# Patient Record
Sex: FEMALE | Race: WHITE | NOT HISPANIC OR LATINO | Employment: OTHER | URBAN - METROPOLITAN AREA
[De-identification: names, ages, dates, MRNs, and addresses within clinical notes are randomized per-mention and may not be internally consistent; named-entity substitution may affect disease eponyms.]

---

## 2024-04-05 ENCOUNTER — MEDICAL CORRESPONDENCE (OUTPATIENT)
Dept: HEALTH INFORMATION MANAGEMENT | Facility: CLINIC | Age: 59
End: 2024-04-05
Payer: COMMERCIAL

## 2024-04-09 ENCOUNTER — TRANSCRIBE ORDERS (OUTPATIENT)
Dept: OTHER | Age: 59
End: 2024-04-09

## 2024-04-09 DIAGNOSIS — K22.4 ESOPHAGEAL DYSMOTILITY AFTER BARIATRIC SURGERY: ICD-10-CM

## 2024-04-09 DIAGNOSIS — K95.89 ESOPHAGEAL DYSMOTILITY AFTER BARIATRIC SURGERY: ICD-10-CM

## 2024-04-09 DIAGNOSIS — R13.10 DYSPHAGIA, UNSPECIFIED TYPE: Primary | ICD-10-CM

## 2024-05-19 ENCOUNTER — HEALTH MAINTENANCE LETTER (OUTPATIENT)
Age: 59
End: 2024-05-19

## 2024-06-19 ENCOUNTER — TELEPHONE (OUTPATIENT)
Dept: GASTROENTEROLOGY | Facility: CLINIC | Age: 59
End: 2024-06-19
Payer: COMMERCIAL

## 2024-06-19 NOTE — TELEPHONE ENCOUNTER
REFERRAL INFORMATION:  Referring Provider:  Quiana Tovar DO  Referring Clinic:  [facility]  Ph: [phone], Fx: [phone]  Reason for Visit/Diagnosis:   R13.10 (ICD-10-CM) - Dysphagia, unspecified type   K95.89, K22.4 (ICD-10-CM) - Esophageal dysmotility after bariatric surgery, Esophageal dysmotility with ineffective swallows and associated dysphagia. Status post gastric bypass Lena-Anthony reconstrction.         FUTURE VISIT INFORMATION:  Appointment Date: 6/21/24  Appointment Time:      NOTES STATUS DETAILS   OFFICE NOTE from Referring Provider Care Everywhere 3/27/24- quiana SHAFER 3/6/24   OFFICE NOTE from Other Specialist Care Everywhere 10/25/23-[facility]    9/1/23 [facility] DISCHARGE SUMMARY/  ED VISITS Care Everywhere    OPERATIVE REPORT Care Everywhere LAPAROSCOPIC GASTRIC RESTRICTIVE PX,W/GASTRIC BYPASS/ LENA-EN-Y,<150CM 7/21/15-Dr. Lozada-[facility]   MEDICATION LIST Internal         ENDOSCOPY  Care Everywhere 1/11/24-[facility]    1/4/23 [facility]    9/14/2085-Djybknit-Nuugb   COLONOSCOPY Care Everywhere 1/3/57-Nypdpabg-Ltxdt   STOOL TESTING Care Everywhere 2/1/23-H. Pylori-[facility]   PERTINENT LABS Care Everywhere    IMAGING (CT, MRI, EGD, MRCP, Small Bowel Follow Through/SBT, MR/CT Enterography) Care Everywhere CT abdomen pelvis 3/20/24, 8/30/2324-Qyddgufk-Bnkne    XR Esophagram 3/20/24, 10/26/48-Ndhzmyoy-Aszgs     Records Requested     June 20, 2024 7:48 AM  [id number]   Facility  [facility]   Outcome Requested images

## 2024-06-19 NOTE — TELEPHONE ENCOUNTER
reached out to offer Pt a sooner appointment with Dr. Duffy on 6/21/2024. Pt accepted an appointment on 6/21/2024 at 9:40 AM for a virtual visit.

## 2024-06-21 ENCOUNTER — VIRTUAL VISIT (OUTPATIENT)
Dept: GASTROENTEROLOGY | Facility: CLINIC | Age: 59
End: 2024-06-21
Payer: COMMERCIAL

## 2024-06-21 ENCOUNTER — MYC MEDICAL ADVICE (OUTPATIENT)
Dept: GASTROENTEROLOGY | Facility: CLINIC | Age: 59
End: 2024-06-21

## 2024-06-21 ENCOUNTER — PRE VISIT (OUTPATIENT)
Dept: GASTROENTEROLOGY | Facility: CLINIC | Age: 59
End: 2024-06-21

## 2024-06-21 VITALS — BODY MASS INDEX: 32.58 KG/M2 | HEIGHT: 68 IN | WEIGHT: 215 LBS

## 2024-06-21 DIAGNOSIS — K22.4 ESOPHAGEAL DYSMOTILITY AFTER BARIATRIC SURGERY: Primary | ICD-10-CM

## 2024-06-21 DIAGNOSIS — R12 HEARTBURN: ICD-10-CM

## 2024-06-21 DIAGNOSIS — R09.A2 GLOBUS SENSATION: ICD-10-CM

## 2024-06-21 DIAGNOSIS — R11.10 REGURGITATION OF FOOD: ICD-10-CM

## 2024-06-21 DIAGNOSIS — K22.4 ESOPHAGEAL DYSMOTILITY AFTER BARIATRIC SURGERY: ICD-10-CM

## 2024-06-21 DIAGNOSIS — K95.89 ESOPHAGEAL DYSMOTILITY AFTER BARIATRIC SURGERY: Primary | ICD-10-CM

## 2024-06-21 DIAGNOSIS — R13.19 ESOPHAGEAL DYSPHAGIA: ICD-10-CM

## 2024-06-21 DIAGNOSIS — R13.19 ESOPHAGEAL DYSPHAGIA: Primary | ICD-10-CM

## 2024-06-21 DIAGNOSIS — K95.89 ESOPHAGEAL DYSMOTILITY AFTER BARIATRIC SURGERY: ICD-10-CM

## 2024-06-21 PROCEDURE — 99205 OFFICE O/P NEW HI 60 MIN: CPT | Mod: 95 | Performed by: INTERNAL MEDICINE

## 2024-06-21 PROCEDURE — G2211 COMPLEX E/M VISIT ADD ON: HCPCS | Mod: 95 | Performed by: INTERNAL MEDICINE

## 2024-06-21 ASSESSMENT — PAIN SCALES - GENERAL: PAINLEVEL: NO PAIN (0)

## 2024-06-21 NOTE — LETTER
6/21/2024      Julianna Reyna  [address]  [place] [address]      Dear Colleague,    Thank you for referring your patient, Julianna Reyna, to the [facility] [place]. Please see a copy of my visit note below.    Virtual Visit Details    Type of service:  Video Visit   Video Start Time: 9:23 AM  Video End Time: 948    Originating Location (pt. Location): Home    Distant Location (provider location):  On-site  Platform used for Video Visit: AmWellGastroenterology Visit for: Julianna Reyna 1965   MRN: [id number]     Reason for Visit:  chief complaint    Referred by: La  / Jero [address] [place] [id number]  Patient Care Team:  Connor Tovar DO as PCP - General (General Surgery)  Connor Tovar DO (General Surgery)  Andrew Duffy DO as Physician (Gastroenterology)    History of Present Illness:   Julianna Reyna is a 59 year old female with wendy en y gastric bypass, hiatal hernia, dysphagia, JIMENEZ on CPAP, trigeminal neuralgia who is presenting as a new patient in consultation at the request of Dr. Tovar with a chief complaint of dysphagia.    ---------------------------------------------------------------  6/21/24  Julianna Reyna states she has had issues with swallowing. She feels that food gets stuck in her throat. She feels that she has a constant glob of mucus in her throat. In order to get what she feels is in her throat out she will induce regurgitation. She will be able to eliminate mucus followed by food. There is no sign of digestion. She feels that what comes out is the food as she has swallowed it. This sensation has been for at least 3 years ago following corpectomy when they moved her esophagus. The regurgitation started 9 months ago. States that the manometry shows that things are not going down as they should. Says that she was told to eat more slowly and take smaller bites. She feels that the glob of mucus is the main problem. Regardless  of food or water she always has the sensation of globus. The patient states she is extremely fatigued and believes that her difficulty swallowing is the cause. Maybe ineffective esophageal motility based on order for referral. If she waits too long she can have effortless regurgitation. Currently on omeprazole 20mg twice daily. She has been on it for quite awhile for GERD. The patient faithfully takes the med and is uncertain if she would have symptoms if she didn't take a dose. She can have stomach burning and her biggest fear is having a recurrence of that if she stopped the medication. Patient states that after undergoing endoscopy with dilation for barium tablet getting hung up at [facility] she had benefit for maybe one week if that. Patient is tired of regurgitating which has reduced her ability to eat.     See updated BEDQ and Eckardt score below: These patient reported outcomes are pertinent to the HPI and have personally been reviewed.    ---------------------------------------------------------------     Wt Readings from Last 5 Encounters:   06/21/24 97.5 kg (215 lb)     Esophageal Questionnaire(s)    BEDQ Questionnaire      6/20/2024     9:30 AM   BEDQ Questionnaire: How Often Have You Had the Following?   Trouble eating solid food (meat, bread, vegetables) 3   Trouble eating soft foods (yogurt, jello, pudding) 0   Trouble swallowing liquids 1   Pain while swallowing 0   Coughing or choking while swallowing foods or liquids 0   Total Score: 4         6/20/2024     9:30 AM   BEDQ Questionnaire: Discomfort/Pain Ratings   Eating solid food (meat, bread, vegetables) 4   Eating soft foods (yogurt, jello, pudding) 1   Drinking liquid 1   Total Score: 6       Eckardt Questionnaire      6/20/2024     9:31 AM   Eckardt Questionnaire   Dysphagia 2   Regurgitation 2   Retrosternal Pain 1   Weight Loss (kg) 1   Total Score:  6       Promis 10 Questionnaire      6/20/2024     9:37 AM   PROMIS 10 FLOWSHEET DATA   In general,  would you say your health is: 2   In general, would you say your quality of life is: 2   In general, how would you rate your physical health? 2   In general, how would you rate your mental health, including your mood and your ability to think? 3   In general, how would you rate your satisfaction with your social activities and relationships? 4   In general, please rate how well you carry out your usual social activities and roles. (This includes activities at home, at work and in your community, and responsibilities as a parent, child, spouse, employee, friend, etc.) 4   To what extent are you able to carry out your everyday physical activities such as walking, climbing stairs, carrying groceries, or moving a chair? 3   In the past 7 days, how often have you been bothered by emotional problems such as feeling anxious, depressed, or irritable? 2   In the past 7 days, how would you rate your fatigue on average? 4   In the past 7 days, how would you rate your pain on average, where 0 means no pain, and 10 means worst imaginable pain? 5   Mental health question re-calculation - no clinical value 4   Physical health question re-calculation - no clinical value 2   Pain question re-calculation - no clinical value 3   Global Mental Health Score 13   Global Physical Health Score 10   PROMIS TOTAL - SUBSCORES 23       STUDIES & PROCEDURES:    EGD:   Date: 1/11/24  Impression:  No endoscopic abnormality was evident in the esophagus to explain the           patient's complaint of dysphagia.  It was decided, however, to proceed with           dilation of the entire esophagus.  A TTS dilator was passed through the scope.           Dilation with an 18-19-20 mm balloon (to a maximum balloon size of 20 mm)           dilator was performed to 20 mm.  The dilation site was examined following           endoscope reinsertion and showed mild mucosal disruption.  This indicates that           the Upper Esophageal Sphincter muscle was tight  and has now been successfully           dilated.        - Evidence of a Orlando-en-Y anastomosis was found in the gastric body.  This was           characterized by healthy appearing mucosa.  No evidence of anastomotic           stricture.        -  The examined jejunum was normal.  Complications:        -  No immediate complications.  Estimated Blood Loss:        -  Estimated blood loss: 1 mL.  Impression:        - Tight upper esophageal sphincter.  Esophagus dilated.  Dilated.        -  A Orlando-en-Y anastomosis was found, characterized by healthy appearing           mucosa.        -  Normal examined jejunum.        -  No specimens collected.  Recommendation:        -  Patient has a contact number available for emergencies.  The signs and           symptoms of potential delayed complications were discussed with the patient.           Return to normal activities tomorrow.  Written discharge instructions were           provided to the patient.        - Mechanical soft diet today.  Take Tylenol if needed for the next few days if           post-dilation pain occurs.        -  Continue present medications.        - Repeat EGD with dilation in the future as needed if persistent dysphagia           recurs.  DO KELLEY SOSA   Pathology Report:    Colonoscopy:  Date:  Impression:  Pathology Report:     EndoFLIP directed at the UES or LES (8cm (EF-325) balloon length or 16cm (EF-322) balloon length):   Date:  8cm balloon  Balloon inflation Balloon pressure CSA (mm^2) DI (mm^2/mmHg) Dmin (mm) Compliance   20 (ladmark ID)        30        40        50           16cm balloon  Balloon inflation Balloon pressure CSA (mm^2) DI (mm^2/mmHg) Dmin (mm) Compliance   30 (ladmark ID)        40        50        60        70           High Resolution Manometry:  Date: 3/27/24  Impression:  ?ineffective esophageal motility    PH/Impedance:  Date:  Impression:     Bravo:  48 or  96hr  Date:  Impression:    CT:  Date:  Impression:    Esophagram:  Date: 3/20/24  Impression:  1. Small to moderate size sliding esophageal hiatal hernia which has not significantly changed. No gastroesophageal reflux during the exam.   2. Mild delay in passage of the 1/2 inch barium pill through the gastrojejunostomy into the gastric bypass Orlando limb potentially from a mild stricture of the gastrojejunal anastomosis. No delay in passage of liquid barium through the gastrojejunostomy     Modified Barium Esophagram:  Date:  Impression:     Prior medical records were reviewed including, but not limited to, notes from referring providers, lab work, radiographic tests, and other diagnostic tests. Pertinent results were summarized above.     History   No past medical history on file.    No past surgical history on file.    Social History     Socioeconomic History    Marital status: Single     Spouse name: Not on file    Number of children: Not on file    Years of education: Not on file    Highest education level: Not on file   Occupational History    Not on file   Tobacco Use    Smoking status: Not on file    Smokeless tobacco: Not on file   Substance and Sexual Activity    Alcohol use: Not on file    Drug use: Not on file    Sexual activity: Not on file   Other Topics Concern    Not on file   Social History Narrative    Not on file     Social Determinants of Health     Financial Resource Strain: Low Risk  (12/27/2023)    [facility], [facility]    Overall Financial Resource Strain (CARDIA)     Difficulty of Paying Living Expenses: Not hard at all   Food Insecurity: No Food Insecurity (3/27/2024)    [facility], [facility]    Hunger Vital Sign     Worried About Running Out of Food in the Last Year: Never true     Ran Out of Food in the Last Year: Never  true   Transportation Needs: No Transportation Needs (3/27/2024)    [facility], [facility]    PRAPARE - Transportation     Lack of Transportation (Medical): No     Lack of Transportation (Non-Medical): No   Physical Activity: Sufficiently Active (11/26/2023)    Received from [facility], [facility]    Exercise Vital Sign     Days of Exercise per Week: 5 days     Minutes of Exercise per Session: 40 min   Stress: No Stress Concern Present (11/26/2023)    Received from [facility], [facility]    [place] [employer] - Occupational Stress Questionnaire     Feeling of Stress : Only a little   Social Connections: Moderately Integrated (11/26/2023)    Received from [facility], [facility]    Social Connection and Isolation Panel [NHANES]     Frequency of Communication with Friends and Family: More than three times a week     Frequency of Social Gatherings with Friends and Family: Never     Attends Restorationism Services: More than 4 times per year     Active Member of Clubs or Organizations: No     Attends Club or Organization Meetings: Never     Marital Status: Living with partner   Interpersonal Safety: Not At Risk (4/1/2024)    Received from [facility] [employer] [facility]     Do you feel UNSAFE in any of your personal relationships with your family members or any other acquaintances?: No   Housing Stability: Low Risk  (3/27/2024)    Received from [facility]     Retired - What is your living situation today? : I have a steady place to live       No family history on file.  Family history reviewed and edited as  "appropriate    Medications and Allergies:     No outpatient encounter medications on file as of 6/21/2024.     No facility-administered encounter medications on file as of 6/21/2024.        No Known Allergies     Review of systems:  A full 10 point review of systems was obtained and was negative except for the pertinent positives and negatives stated within the HPI.    Objective Findings:   Physical Exam:    Constitutional: Ht 1.727 m (5' 8\")   Wt 97.5 kg (215 lb)   BMI 32.69 kg/m    General: Alert, cooperative, no distress, well-appearing  Head: Atraumatic, normocephalic, no obvious abnormalities   Eyes: EOMI, Sclera anicteric, no obvious conjunctival hemorrhage   Nose: Nares normal, no obvious malformation, no obvious rhinorrhea   Respiratory: normal appearing respirations, no cough  Musculoskeletal: Range of motion intact, no obvious strength deficit  Skin: No jaundice, no obvious rash  Neurologic: AAOx3, no obvious neurologic abnormality  Psychiatric: Normal Affect, appropriate mood  Extremities: No obvious edema, no obvious malformation     Labs, Radiology, Pathology     No results found for: \"WBC\", \"HGB\", \"PLT\", \"CHOL\", \"TRIG\", \"HDL\", \"LDLDIRECT\", \"ALT\", \"AST\", \"NA\", \"CREATININE\", \"BUN\", \"CO2\", \"TSH\", \"INR\", \"GLUF\"     Liver Function Studies - No results for input(s): \"PROTTOTAL\", \"ALBUMIN\", \"BILITOTAL\", \"ALKPHOS\", \"AST\", \"ALT\", \"BILIDIRECT\" in the last 11682 hours.       Patient Active Problem List    Diagnosis Date Noted    Esophageal dysmotility after bariatric surgery 06/21/2024     Priority: Medium    Esophageal dysphagia 06/21/2024     Priority: Medium    Regurgitation of food 06/21/2024     Priority: Medium    Globus sensation 06/21/2024     Priority: Medium    Heartburn 06/21/2024     Priority: Medium      Assessment and Plan   Assessment:    Julianna Reyna is a 59 year old female with wendy en y gastric bypass, hiatal hernia, dysphagia, JIMENEZ on CPAP, trigeminal neuralgia who is presenting as a new " patient in consultation at the request of Dr. Tovar with a chief complaint of dysphagia.      The patient has significant symptoms of dysphagia and globus with associated regurgitation. She reports an abnormal manometry however I do not have access to the report at this time. Previous balloon dilation at the [facility] has had minimal impact and barium esophagram shows that the contrast passed easily with delay of barium tablet.     I have messaged my team to request the manometry report for further review. Future testing could include repeat manometry, EGD with FLIP, and possible TBE.     I have asked that she decrease her omeprazole to 20mg daily and monitor symptoms for reflux. She has been on BID dosing for a long time and it is possible she could have similar benefit with once daily dosing. I do not suspect this will have any impact on her swallowing or globus.     Regarding globus this may be a function of hypersensitivity and hypervigilance in the setting of dysphagia and regurgitation rather than a cause of the symptoms. I do not feel neuromodulation is warranted yet but may be a part of therapy in the future.     1. Esophageal dysphagia    2. Esophageal dysmotility after bariatric surgery    3. Regurgitation of food    4. Globus sensation    5. Heartburn       No orders of the defined types were placed in this encounter.    Plan:  Follow up with dietitian regarding food suggestions  Begin taking omeprazole 20mg daily instead of twice daily. Monitor your symptoms and provide a Mychart message in 2 weeks. If your symptoms of reflux worsen and remain worse at the 2 week point you may begin taking the omeprazole 20mg twice daily again  We will request the record of your high resolution esophageal manometry for further review  Future testing could include repeat manometry, endoscopy with endoFLIP, possible timed barium esophagram all at the [facility]    Follow up plan:   Return to clinic 6 months and as  needed.    The risks and benefits of my recommendations, as well as other treatment options were discussed with the patient and any available family today. All questions were answered.     Follow up: As planned above. Today, I personally spent 25 minutes in direct face to face time with the patient, of which greater than 50% of the time was spent in patient education and counseling as described above. Approximately 25 minutes were spent on indirect care associated with the patient's consultation including but not limited to review of: patient medical records to date, clinic visits, hospital records, lab results, imaging studies, procedural documentation, and coordinating care with other providers. The findings from this review are summarized in the above note. All of the above accounted for a cumulative time of 50 minutes and was performed on the date of service.     The patient verbalized understanding of the plan and was appreciative for the time spent and information provided during the office visit.     Author:   Andrew Duffy DO   of Medicine  Director, Esophageal Disorders Program  Division of Gastroenterology, Hepatology, and Nutrition  [facility]    Dr. Duffy speaks for [employer] regarding dupilumab and has participated in advisory boards for the medication. He receives income for these activities. When discussing the medication, patients were informed of Dr. Duffy's role and potential conflict of interest. All questions and/or concerns were answered during the encounter.     Documentation assisted by voice recognition and documentation system.      Again, thank you for allowing me to participate in the care of your patient.      Sincerely,    Andrew Duffy DO

## 2024-06-21 NOTE — PROGRESS NOTES
Virtual Visit Details    Type of service:  Video Visit   Video Start Time: 9:23 AM  Video End Time: 948    Originating Location (pt. Location): Home    Distant Location (provider location):  On-site  Platform used for Video Visit: [facility] Visit for: Julianna Reyna 1965   MRN: [id number]     Reason for Visit:  chief complaint    Referred by: La  / Jero [address] [place] [id number]  Patient Care Team:  Connor Tovar DO as PCP - General (General Surgery)  Connor Tovar DO (General Surgery)  Andrew Duffy DO as Physician (Gastroenterology)    History of Present Illness:   Julianna Reyna is a 59 year old female with wendy en y gastric bypass, hiatal hernia, dysphagia, JIMENEZ on CPAP, trigeminal neuralgia who is presenting as a new patient in consultation at the request of Dr. Tovar with a chief complaint of dysphagia.    ---------------------------------------------------------------  6/21/24  Julianna Reyna states she has had issues with swallowing. She feels that food gets stuck in her throat. She feels that she has a constant glob of mucus in her throat. In order to get what she feels is in her throat out she will induce regurgitation. She will be able to eliminate mucus followed by food. There is no sign of digestion. She feels that what comes out is the food as she has swallowed it. This sensation has been for at least 3 years ago following corpectomy when they moved her esophagus. The regurgitation started 9 months ago. States that the manometry shows that things are not going down as they should. Says that she was told to eat more slowly and take smaller bites. She feels that the glob of mucus is the main problem. Regardless of food or water she always has the sensation of globus. The patient states she is extremely fatigued and believes that her difficulty swallowing is the cause. Maybe ineffective esophageal motility based on order for referral. If she waits too  long she can have effortless regurgitation. Currently on omeprazole 20mg twice daily. She has been on it for quite awhile for GERD. The patient faithfully takes the med and is uncertain if she would have symptoms if she didn't take a dose. She can have stomach burning and her biggest fear is having a recurrence of that if she stopped the medication. Patient states that after undergoing endoscopy with dilation for barium tablet getting hung up at [facility] she had benefit for maybe one week if that. Patient is tired of regurgitating which has reduced her ability to eat.     See updated BEDQ and Eckardt score below: These patient reported outcomes are pertinent to the HPI and have personally been reviewed.    ---------------------------------------------------------------     Wt Readings from Last 5 Encounters:   06/21/24 97.5 kg (215 lb)     Esophageal Questionnaire(s)    BEDQ Questionnaire      6/20/2024     9:30 AM   BEDQ Questionnaire: How Often Have You Had the Following?   Trouble eating solid food (meat, bread, vegetables) 3   Trouble eating soft foods (yogurt, jello, pudding) 0   Trouble swallowing liquids 1   Pain while swallowing 0   Coughing or choking while swallowing foods or liquids 0   Total Score: 4         6/20/2024     9:30 AM   BEDQ Questionnaire: Discomfort/Pain Ratings   Eating solid food (meat, bread, vegetables) 4   Eating soft foods (yogurt, jello, pudding) 1   Drinking liquid 1   Total Score: 6       Eckardt Questionnaire      6/20/2024     9:31 AM   Eckardt Questionnaire   Dysphagia 2   Regurgitation 2   Retrosternal Pain 1   Weight Loss (kg) 1   Total Score:  6       Promis 10 Questionnaire      6/20/2024     9:37 AM   PROMIS 10 FLOWSHEET DATA   In general, would you say your health is: 2   In general, would you say your quality of life is: 2   In general, how would you rate your physical health? 2   In general, how would you rate your mental health, including your mood and your ability to think?  3   In general, how would you rate your satisfaction with your social activities and relationships? 4   In general, please rate how well you carry out your usual social activities and roles. (This includes activities at home, at work and in your community, and responsibilities as a parent, child, spouse, employee, friend, etc.) 4   To what extent are you able to carry out your everyday physical activities such as walking, climbing stairs, carrying groceries, or moving a chair? 3   In the past 7 days, how often have you been bothered by emotional problems such as feeling anxious, depressed, or irritable? 2   In the past 7 days, how would you rate your fatigue on average? 4   In the past 7 days, how would you rate your pain on average, where 0 means no pain, and 10 means worst imaginable pain? 5   Mental health question re-calculation - no clinical value 4   Physical health question re-calculation - no clinical value 2   Pain question re-calculation - no clinical value 3   Global Mental Health Score 13   Global Physical Health Score 10   PROMIS TOTAL - SUBSCORES 23       STUDIES & PROCEDURES:    EGD:   Date: 1/11/24  Impression:  No endoscopic abnormality was evident in the esophagus to explain the           patient's complaint of dysphagia.  It was decided, however, to proceed with           dilation of the entire esophagus.  A TTS dilator was passed through the scope.           Dilation with an 18-19-20 mm balloon (to a maximum balloon size of 20 mm)           dilator was performed to 20 mm.  The dilation site was examined following           endoscope reinsertion and showed mild mucosal disruption.  This indicates that           the Upper Esophageal Sphincter muscle was tight and has now been successfully           dilated.        - Evidence of a Orlando-en-Y anastomosis was found in the gastric body.  This was           characterized by healthy appearing mucosa.  No evidence of anastomotic           stricture.         -  The examined jejunum was normal.  Complications:        -  No immediate complications.  Estimated Blood Loss:        -  Estimated blood loss: 1 mL.  Impression:        - Tight upper esophageal sphincter.  Esophagus dilated.  Dilated.        -  A Orlando-en-Y anastomosis was found, characterized by healthy appearing           mucosa.        -  Normal examined jejunum.        -  No specimens collected.  Recommendation:        -  Patient has a contact number available for emergencies.  The signs and           symptoms of potential delayed complications were discussed with the patient.           Return to normal activities tomorrow.  Written discharge instructions were           provided to the patient.        - Mechanical soft diet today.  Take Tylenol if needed for the next few days if           post-dilation pain occurs.        -  Continue present medications.        - Repeat EGD with dilation in the future as needed if persistent dysphagia           recurs.  DO KELLEY SOSA   Pathology Report:    Colonoscopy:  Date:  Impression:  Pathology Report:     EndoFLIP directed at the UES or LES (8cm (EF-325) balloon length or 16cm (EF-322) balloon length):   Date:  8cm balloon  Balloon inflation Balloon pressure CSA (mm^2) DI (mm^2/mmHg) Dmin (mm) Compliance   20 (ladmark ID)        30        40        50           16cm balloon  Balloon inflation Balloon pressure CSA (mm^2) DI (mm^2/mmHg) Dmin (mm) Compliance   30 (ladmark ID)        40        50        60        70           High Resolution Manometry:  Date: 3/27/24  Impression:  ?ineffective esophageal motility    PH/Impedance:  Date:  Impression:     Bravo:  48 or 96hr  Date:  Impression:    CT:  Date:  Impression:    Esophagram:  Date: 3/20/24  Impression:  1. Small to moderate size sliding esophageal hiatal hernia which has not significantly changed. No gastroesophageal reflux during the exam.   2. Mild delay in passage of the 1/2 inch barium pill through the  gastrojejunostomy into the gastric bypass Orlando limb potentially from a mild stricture of the gastrojejunal anastomosis. No delay in passage of liquid barium through the gastrojejunostomy     Modified Barium Esophagram:  Date:  Impression:     Prior medical records were reviewed including, but not limited to, notes from referring providers, lab work, radiographic tests, and other diagnostic tests. Pertinent results were summarized above.     History   No past medical history on file.    No past surgical history on file.    Social History     Socioeconomic History    Marital status: Single     Spouse name: Not on file    Number of children: Not on file    Years of education: Not on file    Highest education level: Not on file   Occupational History    Not on file   Tobacco Use    Smoking status: Not on file    Smokeless tobacco: Not on file   Substance and Sexual Activity    Alcohol use: Not on file    Drug use: Not on file    Sexual activity: Not on file   Other Topics Concern    Not on file   Social History Narrative    Not on file     Social Determinants of Health     Financial Resource Strain: Low Risk  (12/27/2023)    Received from [facility] [employer] [facility], [facility]    Overall Financial Resource Strain (CARDIA)     Difficulty of Paying Living Expenses: Not hard at all   Food Insecurity: No Food Insecurity (3/27/2024)    Received from [facility] [employer] [facility] [employer] [facility], [facility]    Hunger Vital Sign     Worried About Running Out of Food in the Last Year: Never true     Ran Out of Food in the Last Year: Never true   Transportation Needs: No Transportation Needs (3/27/2024)    Received from [facility] [employer] [facility] [employer] [facility], [facility]    PRAPARE - Transportation     Lack of Transportation (Medical): No     Lack of Transportation (Non-Medical): No   Physical  Activity: Sufficiently Active (11/26/2023)    [facility] from [facility], [facility]    Exercise Vital Sign     Days of Exercise per Week: 5 days     Minutes of Exercise per Session: 40 min   Stress: No Stress Concern Present (11/26/2023)    Received from [facility], [facility]    [place] of Occupational Health - Occupational Stress Questionnaire     Feeling of Stress : Only a little   Social Connections: Moderately Integrated (11/26/2023)    Received from [facility], [facility]    Social Connection and Isolation Panel [NHANES]     Frequency of Communication with Friends and Family: More than three times a week     Frequency of Social Gatherings with Friends and Family: Never     Attends Hoahaoism Services: More than 4 times per year     Active Member of Clubs or Organizations: No     Attends Club or Organization Meetings: Never     Marital Status: Living with partner   Interpersonal Safety: Not At Risk (4/1/2024)    Received from [facility] Custom IPV     Do you feel UNSAFE in any of your personal relationships with your family members or any other acquaintances?: No   Housing Stability: Low Risk  (3/27/2024)    Received from [facility] Housing Domain     Retired - What is your living situation today? : I have a steady place to live       No family history on file.  Family history reviewed and edited as appropriate    Medications and Allergies:     No outpatient encounter medications on file as of 6/21/2024.     No facility-administered encounter medications on file as of 6/21/2024.        No Known Allergies     Review of systems:  A full 10 point review of systems was obtained and was negative except for  "the pertinent positives and negatives stated within the HPI.    Objective Findings:   Physical Exam:    Constitutional: Ht 1.727 m (5' 8\")   Wt 97.5 kg (215 lb)   BMI 32.69 kg/m    General: Alert, cooperative, no distress, well-appearing  Head: Atraumatic, normocephalic, no obvious abnormalities   Eyes: EOMI, Sclera anicteric, no obvious conjunctival hemorrhage   Nose: Nares normal, no obvious malformation, no obvious rhinorrhea   Respiratory: normal appearing respirations, no cough  Musculoskeletal: Range of motion intact, no obvious strength deficit  Skin: No jaundice, no obvious rash  Neurologic: AAOx3, no obvious neurologic abnormality  Psychiatric: Normal Affect, appropriate mood  Extremities: No obvious edema, no obvious malformation     Labs, Radiology, Pathology     No results found for: \"WBC\", \"HGB\", \"PLT\", \"CHOL\", \"TRIG\", \"HDL\", \"LDLDIRECT\", \"ALT\", \"AST\", \"NA\", \"CREATININE\", \"BUN\", \"CO2\", \"TSH\", \"INR\", \"GLUF\"     Liver Function Studies - No results for input(s): \"PROTTOTAL\", \"ALBUMIN\", \"BILITOTAL\", \"ALKPHOS\", \"AST\", \"ALT\", \"BILIDIRECT\" in the last 70370 hours.       Patient Active Problem List    Diagnosis Date Noted    Esophageal dysmotility after bariatric surgery 06/21/2024     Priority: Medium    Esophageal dysphagia 06/21/2024     Priority: Medium    Regurgitation of food 06/21/2024     Priority: Medium    Globus sensation 06/21/2024     Priority: Medium    Heartburn 06/21/2024     Priority: Medium      Assessment and Plan   Assessment:    Julianna Reyna is a 59 year old female with wendy en y gastric bypass, hiatal hernia, dysphagia, JIMENEZ on CPAP, trigeminal neuralgia who is presenting as a new patient in consultation at the request of Dr. Tovar with a chief complaint of dysphagia.      The patient has significant symptoms of dysphagia and globus with associated regurgitation. She reports an abnormal manometry however I do not have access to the report at this time. Previous balloon dilation at " the [facility] has had minimal impact and barium esophagram shows that the contrast passed easily with delay of barium tablet.     I have messaged my team to request the manometry report for further review. Future testing could include repeat manometry, EGD with FLIP, and possible TBE.     I have asked that she decrease her omeprazole to 20mg daily and monitor symptoms for reflux. She has been on BID dosing for a long time and it is possible she could have similar benefit with once daily dosing. I do not suspect this will have any impact on her swallowing or globus.     Regarding globus this may be a function of hypersensitivity and hypervigilance in the setting of dysphagia and regurgitation rather than a cause of the symptoms. I do not feel neuromodulation is warranted yet but may be a part of therapy in the future.    Addendum:  Insurance is not permitting me to place order for dietitian.     1. Esophageal dysphagia    2. Esophageal dysmotility after bariatric surgery    3. Regurgitation of food    4. Globus sensation    5. Heartburn       No orders of the defined types were placed in this encounter.    Plan:  Follow up with dietitian regarding food suggestions  Begin taking omeprazole 20mg daily instead of twice daily. Monitor your symptoms and provide a Mychart message in 2 weeks. If your symptoms of reflux worsen and remain worse at the 2 week point you may begin taking the omeprazole 20mg twice daily again  We will request the record of your high resolution esophageal manometry for further review  Future testing could include repeat manometry, endoscopy with endoFLIP, possible timed barium esophagram all at the [facility]    Follow up plan:   Return to clinic 6 months and as needed.    The risks and benefits of my recommendations, as well as other treatment options were discussed with the patient and any available family today. All questions were answered.     Follow up: As planned above. Today, I  personally spent 25 minutes in direct face to face time with the patient, of which greater than 50% of the time was spent in patient education and counseling as described above. Approximately 25 minutes were spent on indirect care associated with the patient's consultation including but not limited to review of: patient medical records to date, clinic visits, hospital records, lab results, imaging studies, procedural documentation, and coordinating care with other providers. The findings from this review are summarized in the above note. All of the above accounted for a cumulative time of 50 minutes and was performed on the date of service.     The patient verbalized understanding of the plan and was appreciative for the time spent and information provided during the office visit.     Author:   Andrew Duffy DO   of Medicine  Director, Esophageal Disorders Program  Division of Gastroenterology, Hepatology, and Nutrition  [facility]    Dr. Duffy speaks for [employer] regarding dupilumab and has participated in advisory boards for the medication. He receives income for these activities. When discussing the medication, patients were informed of Dr. Duffy's role and potential conflict of interest. All questions and/or concerns were answered during the encounter.     Documentation assisted by voice recognition and documentation system.

## 2024-06-21 NOTE — NURSING NOTE
Is the patient currently in the state of [place]? YES    Visit mode:VIDEO    If the visit is dropped, the patient can be reconnected by: VIDEO VISIT: Text to cell phone:   Telephone Information:   Mobile [phone]       Will anyone else be joining the visit? NO  (If patient encounters technical issues they should call [phone])    How would you like to obtain your AVS? MyChart    Are changes needed to the allergy or medication list? No    Are refills needed on medications prescribed by this physician? NO    Reason for visit: Video Visit (Consult)    Saniya MARTE

## 2024-06-21 NOTE — PATIENT INSTRUCTIONS
It was a pleasure taking care of you today.  I've included a brief summary of our discussion and care plan from today's visit below.  Please review this information with your primary care provider.      Follow up with dietitian regarding food suggestions  Begin taking omeprazole 20mg daily instead of twice daily. Monitor your symptoms and provide a [employer] message in 2 weeks. If your symptoms of reflux worsen and remain worse at the 2 week point you may begin taking the omeprazole 20mg twice daily again  We will request the record of your high resolution esophageal manometry for further review  Future testing could include repeat manometry, endoscopy with endoFLIP, possible timed barium esophagram all at the [facility]      Please call my nurse care coordinator Todd ([phone]) or Joya ([phone]), with any questions or concerns.    If you feel you received exceptional care and are interested in supporting the clinical and research goals of Dr. Duffy or the Division of Gastroenterology, Hepatology, and Nutrition please contact him directly through [employer]  to discuss opportunities to donate.    Sincerely,    Andrew Duffy DO   of Medicine  Director, Esophageal Disorders Program  Division of Gastroenterology, Hepatology, and Nutrition  [facility]      Please see below for any additional questions and scheduling guidelines.    Sign up for [employer]: [employer] patient portal serves as a secure platform for accessing your medical records from the [facility]. Additionally, [employer] facilitates easy, timely, and secure messaging with your care team. If you have not signed up, you may do so by using the provided code or calling [phone].    Coordinating your care after your visit:  There are multiple options for scheduling your follow-up care based on your provider's recommendation.    How do I schedule a follow-up clinic appointment:   After your  appointment, you may receive scheduling assistance with the Clinic Coordinators by having a seat in the waiting room and a Clinic Coordinator will call you up to schedule.  Virtual visits or after you leave the clinic:  Your provider has placed a follow-up order in the shoply portal for scheduling your return appointment. A member of the scheduling team will contact you to schedule.  shoply Scheduling: Timely scheduling through shoply is advised to ensure appointment availability.   Call to schedule: You may schedule your follow-up appointment(s) by calling [phone], option 1.    How do I schedule my endoscopy or colonoscopy procedure:  If a procedure, such as a colonoscopy or upper endoscopy was ordered by your provider, the scheduling team will contact you to schedule this procedure. Or you may choose to call to schedule at   [phone], option 2.  Please allow 20-30 minutes when scheduling a procedure.    How do I get my blood work done? To get your blood work done, you need to schedule a lab appointment at an [facility]. There are multiple ways to schedule:   At the clinic: The Clinic Coordinator you meet after your visit can help you schedule a lab appointment.   shoply scheduling: shoply offers online lab scheduling at all [facility] laboratory locations.   Call to schedule: You can call [phone] to schedule your lab appointment.    How do I schedule my imaging study: To schedule imaging studies, such as CT scans, ultrasounds, MRIs, or X-rays, contact Imaging Services at [phone].    How do I schedule a referral to another doctor: If your provider recommended a referral to another specialist(s), the referral order was placed by your provider. You will receive a phone call to schedule this referral, or you may choose to call the number attached to the referral to self-schedule.    For Post-Visit Question(s):  For any inquiries following today's visit:  Please utilize  [employer] messaging and allow 48 hours for reply or contact the Call Center during normal business hours at [phone], option 3.  For Emergent After-hours questions, contact the On-Call GI Fellow through the [facility]  at [phone].  In addition, you may contact your Nurse directly using the provided contact information.    Test Results:  Test results will be accessible via [employer] in compliance with the 21st Century Cures Act. This means that your results will be available to you at the same time as your provider. Often you may see your results before your provider does. Results are reviewed by staff within two weeks with communication follow-up. Results may be released in the patient portal prior to your care team review.    Prescription Refill(s):  Medication prescribed by your provider will be addressed during your visit. For future refills, please coordinate with your pharmacy. If you have not had a recent clinic visit or routine labs, for your safety, your provider may not be able to refill your prescription.

## 2024-07-09 ENCOUNTER — TELEPHONE (OUTPATIENT)
Dept: GASTROENTEROLOGY | Facility: CLINIC | Age: 59
End: 2024-07-09
Payer: COMMERCIAL

## 2024-07-09 NOTE — TELEPHONE ENCOUNTER
Non-Invasive GI Procedure Scheduling Questionnaire    Scheduling Reminders:  Add-on within 2 weeks require clinic approval (Manometry & HBT)  Manometry requires an in-person interrupter (not needed for HBT)      Have you had a positive Covid test in the last 14 days?  No    Are you active on MyChart?   Yes    What insurance is in the chart?  Other:  [facility]    Ordering/Referring Provider: AARON STEPHENSON    (If ordering provider performs procedure, schedule with ordering provider unless otherwise instructed. )    (Females) Are you currently pregnant? No - Okay to continue scheduling.    Have you ever had or are you awaiting a heart or lung transplant? No - Schedule next available.    Do you need assistance transferring? No - Continue scheduling.    Do you take acid reflux medication or proton-pump inhibitors (PPI)? Yes - Advise patients to discontinue acid suppression medications 2-4 weeks prior to the exam/procedure. ( Scheduled for more than 14 days out.)    Patient Reminders:  Please inform patients to review instructions sent via [employer] or letter two weeks before procedure.  The Manometry exam may take up to 90 minutes.  The Breath Test exam may take up to 4 hours.

## 2024-08-07 ENCOUNTER — VIRTUAL VISIT (OUTPATIENT)
Dept: GASTROENTEROLOGY | Facility: CLINIC | Age: 59
End: 2024-08-07
Payer: COMMERCIAL

## 2024-08-07 DIAGNOSIS — R09.A2 GLOBUS SENSATION: ICD-10-CM

## 2024-08-07 DIAGNOSIS — R13.19 ESOPHAGEAL DYSPHAGIA: ICD-10-CM

## 2024-08-07 DIAGNOSIS — K95.89 ESOPHAGEAL DYSMOTILITY AFTER BARIATRIC SURGERY: Primary | ICD-10-CM

## 2024-08-07 DIAGNOSIS — K22.4 ESOPHAGEAL DYSMOTILITY AFTER BARIATRIC SURGERY: Primary | ICD-10-CM

## 2024-08-07 DIAGNOSIS — R11.10 REGURGITATION OF FOOD: ICD-10-CM

## 2024-08-07 DIAGNOSIS — R12 HEARTBURN: ICD-10-CM

## 2024-08-07 PROCEDURE — 97802 MEDICAL NUTRITION INDIV IN: CPT | Mod: GZ | Performed by: DIETITIAN, REGISTERED

## 2024-08-07 PROCEDURE — 99207 PR NO CHARGE LOS: CPT | Mod: 95 | Performed by: DIETITIAN, REGISTERED

## 2024-08-07 NOTE — NURSING NOTE
Current patient location: [address] [address]    Is the patient currently in the state of [place]? YES    Visit mode:VIDEO    If the visit is dropped, the patient can be reconnected by: VIDEO VISIT: Text to cell phone:   Telephone Information:   Mobile [phone]       Will anyone else be joining the visit? NO  (If patient encounters technical issues they should call [phone]    How would you like to obtain your AVS? MyChart    Are changes needed to the allergy or medication list? No    Are refills needed on medications prescribed by this physician? NO    Rooming Documentation:  Assigned questionnaire(s) completed      Reason for visit: RECHECK    Connor CHEF

## 2024-08-07 NOTE — PATIENT INSTRUCTIONS
It was nice meeting you today. Below are the nutrition recommendations we discussed at your visit.    Please let me know if you have any additional questions.    Nutrition Recommendations    Aim for eating multiple smaller meals spread out throughout the day such as 4-6 smaller meals per day.    Eat more of a softer, pureed and liquid diet as tolerated (below is a list of some pureed to very soft food ideas).    Can prepare some foods/meals ahead of time, so you can easily reheat to have has a meal especially when busy/in a hurry.    Drink at least one or more of your homemade protein smoothies per day.    Can make up extra servings of your smoothie and freeze individual servings so you can easily grab and reblend a frozen serving for a meal or snack    For some extra fiber can blend in 2 kiwi fruits into your smoothie and also could blend in some ground flax or ground layne seeds into your smoothie. (Note some good research showing that eating 2 kiwi fruit per day may be helpful with constipation).    Could add a pre-made protein drink such as Premier protein drink into your coffee in the morning instead of creamer to help get some extra protein.    Continue to drink at least 64 oz or more water per day.    Continue to avoid drinking with meals, stopping fluids 30 minutes before meals and waiting at least 30 minutes after meals to drink.    Wait at least 3 hours after eating before lying down and going to sleep to prevent any issues/symptoms overnight.    Here are some food and beverage ideas for a pureed to very soft diet:    Proteins   [] Protein drinks (such as Boost drinks, Ensure Max, Premier Protein drinks or your protein powder).  [] Low-fat yogurt (blended, avoid the ones with fruit chunks)   [] Low-fat cottage cheese (small curd or mashed with fork)  [] Eggs (mashed or hard boiled, soft cooked. Can mashed and add some english to make an egg salad).  [] Stage 1 or 2 baby food meats  [] Mashed beans (such as  black beans, joe beans or fat-free refried beans). Cautious with the beans and try to separate out any skin you see.   [] Mashed/blended tuna moistened with some lowfat english or gravy  [] Mashed/ground low fat meat, moistened with gravy or sauce/very soft cooked.  [] Mashed tofu/mashed silken tofu.  [] Note: dairy products/milk will also give you some protein.    Starches  [] Hot cereals (Cream of Wheat, Cream of Rice)   [] Mashed potatoes, mashed sweet potatoes, and mashed winter squash-blended well, smooth consistency (no skins)  [] Okay to have baked potatoes and baked sweet potatoes just avoid the skins  [] Winter squash cooked (avoid skins)  [] Crackers that dissolve well when you chew them.    Fruit  [] Applesauce   [] bananas   [] Canned fruits such as canned peaches, pears or apricots or canned mandarins  [] 100% fruit juice  [] Stage 1 baby food fruits   [] Mashed/ripe avocado  [] Peeled soft cooked fruits or blended fruits    Vegetables   [] Can blend or have peeled and very soft/mushier cooked/soft vegetables.  [] Stage 1 baby food vegetables  [] 100% vegetable juice (non pulp, smooth/blended)     NOTE: Avoid skins, peels, membranes, stringy foods.    Milk/dairy  [] Skim, 1% milk, plain soy milk or Lactaid milk/lactose free milk   [] Low-fat smooth/blended yogurt/blended Greek yogurt with no chunks of fruit   [] Low-fat Kefir   [] Lowfat pudding   [] Low-fat cream soup, strained/pureed/blended     Other  [] Herbal tea   [] Popsicles without chunks of fruit  [] Sherbet, sorbet, ice cream (all without chunk/smooth consistency)  [] smooth gravies and pureed/blended sauces without pulp/seeds/skins  [] Gelatin  [] Okay to have some butter/margarine or oil for cooking (be careful not to eat large amounts of fats though).  [] Water, tea, herbal tea, coffee, decaf coffee    Can follow up as needed.    If you would like to schedule a follow up appointment with Maile Anderson, Registered Dietitian, please call  [phone].    Thank you,    Maile Anderson, MS, RD, LD

## 2024-08-07 NOTE — LETTER
8/7/2024      Julianna Reyna  [address]  [place] [address]      Dear Colleague,    Thank you for referring your patient, Julianna Reyna, to the [facility] [place]. Please see a copy of my visit note below.    Virtual Visit Details    Type of service:  Video Visit   Video Start Time:  10:34 AM  Video End Time: 11:10 AM    Originating Location (pt. Location): Home  Distant Location (provider location):  Off-site  Platform used for Video Visit: [facility] Outpatient Medical Nutrition Therapy      Time Spent:  36 minutes  Session Type:  Initial   Referring Physician:  Connor Tovar DO and Andrew Duffy DO  Reason for RD Visit:   dysphagia, esophageal dysmotility after bariatric surgery, regurgitation of food, heartburn    Nutrition Assessment:  Patient is a 59 year old female with history that includes esophageal dysphagia, esophageal dysmotility, regurgitation of food, heartburn, globus sensation, iron deficiency anemia, wendy en Y, hiatal hernia, JIMENEZ on CPap and trigeminal neuralgia, hx corpectomy.     She stated that she tries to eat but has difficulty eating, swallowing food. Sometimes food will go down but other times, it will sit in her throat/esophagus for a long time and will not move down. On those occasions, she has to regurgitate whole food. At night,she tries to eat supper with her partner, but doesn't always tolerate the meal. Sometimes she can eat and other times cannot eat. She will also try to eat breakfast or lunch. If eats breakfast, then doesn't have lunch and vice versa and then a second meal with her partner at supper. She tries to chew food very well. She tends to eat snacks later into the night. She does not have a routine of eating depending on how busy she is and what she wants to eat. She does not eat a lot of bread but if she does then has 12 grain toasted bread, cannot eat untoasted. She can tolerate cream of wheat for example but not  "oatmeal. She tolerates tortilla chips with salsa and chews them well. Meats are very difficult for her to swallow. She can eat some fish, eggs scrambled. Sometimes she tolerates about 1/2 burger and other times cannot. She likes sweets and craves salt. She also drinks a lot of water. She doesn't drink with meals due to hx RNY but also with her swallowing difficulty, drinking with meals makes it work and brings the food up faster. A couple of years ago when she starting having worsening swallowing issues, then she lost 20 lbs, down to ~215 lbs. Since then she has been staying stable at about 215 lbs. She will make a homemade protein smoothie using bariatric brand protein powders that are 18 and 20 grams of protein and adds fruit and spinach and this is tolerated but only has about 2x/week.     Patient Active Problem List   Diagnosis     Esophageal dysmotility after bariatric surgery     Esophageal dysphagia     Regurgitation of food     Globus sensation     Heartburn     Height:   Ht Readings from Last 1 Encounters:   06/21/24 1.727 m (5' 8\")     Weight: she stated that she hasn't lost any weight. Her UBW has been 215 lbs over the past yaear. She had gotten down to 190 lbs after srugery, then regained and got up to 235 lbs. Initially hen had swallowing issues 2 years ago, she lost 20 lbs and now stable at the ~215 lbs.  Wt Readings from Last 10 Encounters:   06/21/24 97.5 kg (215 lb)        BMI: Estimated body mass index is 32.69 kg/m  as calculated from the following:    Height as of 6/21/24: 1.727 m (5' 8\").    Weight as of 6/21/24: 97.5 kg (215 lb).    Diet Recall:  (some usual/recent meals/snacks/beverages):  Meal Food    Breakfast Tries to eat eggs scrambled or skips   Lunch Skips or if didn't eat B then has lunch: lunchable Ham/turkey, cheddar cheese and cracker   Dinner Cream of wheat or hot dish or burger (sometimes 1/2 burger goes down otherwise doesn't go down)   Snacks Sweets: cookies, ice cream, craves " "salt: tortilla chips with salsa and chews well), snacks especially in evening.   Beverages 2 c coffee with creamer, 90 oz water, 2x/week: Homemade shake made with protein shake, frozen  fruit/fruit, spinach, made with juice/coconut milk. Occas cup tea with honey at night   Alcohol Intake none     Frequency of eating/taking out meals: not often especially with swallowing issues     Labs:    Last Comprehensive Metabolic Panel:  No results found for: \"NA\", \"POTASSIUM\", \"CHLORIDE\", \"CO2\", \"ANIONGAP\", \"GLC\", \"BUN\", \"CR\", \"GFRESTIMATED\", \"AAKASH\"  CBC RESULTS: No results for input(s): \"WBC\", \"RBC\", \"HGB\", \"HCT\", \"MCV\", \"MCH\", \"MCHC\", \"RDW\", \"PLT\" in the last 11859 hours.    Pertinent Medications/vitamin and mineral supplements:    she reported taking a probiotic supplement daily.  No current outpatient medications on file.     No current facility-administered medications for this visit.     Food Allergies:  NKFA   Physical Activity:  walks daily for 30 minutes. Used to walk more 2-3 miles/day but got plantar fascitis and hs 2 bad knees and hx partial knee replacement so less walking in past. Goes to PT to strengthen her knees before having another knee surgery.    MALNUTRITION:  % Weight Loss:  None noted  % Intake:  <75% for >/= 3 months (moderate malnutrition)  Subcutaneous Fat Loss:  None observed  Muscle Loss:  None observed  Fluid Retention:  None noted    Malnutrition Diagnosis: Patient does not meet two of the above criteria necessary for diagnosing malnutrition  In Context of:  Chronic illness or disease    Nutrition Prescription: Nutrition Education     Nutrition Intervention      Provided diet education for dysphagia and esoph dysmotility, heartburn, hx RNY and discussed tips for very soft, pureed and liquid diet ideas.    Answered patient's questions. Patient verbalized understanding of education provided. See Goals below.     Goals:    Aim for eating multiple smaller meals spread out throughout the day such as " 4-6 smaller meals per day.    Eat more of a softer, pureed and liquid diet as tolerated (below is a list of some pureed to very soft food ideas).    Can prepare some foods/meals ahead of time, so you can easily reheat to have has a meal especially when busy/in a hurry.    Drink at least one or more of your homemade protein smoothies per day.    Can make up extra servings of your smoothie and freeze individual servings so you can easily grab and reblend a frozen serving for a meal or snack    For some extra fiber can blend in 2 kiwi fruits into your smoothie and also could blend in some ground flax or ground layne seeds into your smoothie. (Note some good research showing that eating 2 kiwi fruit per day may be helpful with constipation).    Could add a pre-made protein drink such as Premier protein drink into your coffee in the morning instead of creamer to help get some extra protein.    Continue to drink at least 64 oz or more water per day.    Continue to avoid drinking with meals, stopping fluids 30 minutes before meals and waiting at least 30 minutes after meals to drink.    Wait at least 3 hours after eating before lying down and going to sleep to prevent any issues/symptoms overnight.    Here are some food and beverage ideas for a pureed to very soft diet:    Proteins   [] Protein drinks (such as Boost drinks, Ensure Max, Premier Protein drinks or your protein powder).  [] Low-fat yogurt (blended, avoid the ones with fruit chunks)   [] Low-fat cottage cheese (small curd or mashed with fork)  [] Eggs (mashed or hard boiled, soft cooked. Can mashed and add some english to make an egg salad).  [] Stage 1 or 2 baby food meats  [] Mashed beans (such as black beans, joe beans or fat-free refried beans). Cautious with the beans and try to separate out any skin you see.   [] Mashed/blended tuna moistened with some lowfat english or gravy  [] Mashed/ground low fat meat, moistened with gravy or sauce/very soft cooked.  []  Mashed tofu/mashed silken tofu.  [] Note: dairy products/milk will also give you some protein.    Starches  [] Hot cereals (Cream of Wheat, Cream of Rice)   [] Mashed potatoes, mashed sweet potatoes, and mashed winter squash-blended well, smooth consistency (no skins)  [] Okay to have baked potatoes and baked sweet potatoes just avoid the skins  [] Winter squash cooked (avoid skins)  [] Crackers that dissolve well when you chew them.    Fruit  [] Applesauce   [] bananas   [] Canned fruits such as canned peaches, pears or apricots or canned mandarins  [] 100% fruit juice  [] Stage 1 baby food fruits   [] Mashed/ripe avocado  [] Peeled soft cooked fruits or blended fruits    Vegetables   [] Can blend or have peeled and very soft/mushier cooked/soft vegetables.  [] Stage 1 baby food vegetables  [] 100% vegetable juice (non pulp, smooth/blended)      NOTE: Avoid skins, peels, membranes, stringy foods.    Milk/dairy  [] Skim, 1% milk, plain soy milk or Lactaid milk/lactose free milk   [] Low-fat smooth/blended yogurt/blended Greek yogurt with no chunks of fruit   [] Low-fat Kefir   [] Lowfat pudding   [] Low-fat cream soup, strained/pureed/blended     Other  [] Herbal tea   [] Popsicles without chunks of fruit  [] Sherbet, sorbet, ice cream (all without chunk/smooth consistency)  [] smooth gravies and pureed/blended sauces without pulp/seeds/skins  [] Gelatin  [] Okay to have some butter/margarine or oil for cooking (be careful not to eat large amounts of fats though).  [] Water, tea, herbal tea, coffee, decaf coffee    Nutrition Monitoring and Evaluation: Will monitor adherence to nutrition recommendations at future RD visits.     Further Medical Nutrition Therapy:  Follow-up as needed.    Patient was encouraged to contact RD with any further questions.    Maile Anderson, MS, RD, LD        Again, thank you for allowing me to participate in the care of your patient.        Sincerely,        Maile Anderson RD

## 2024-08-07 NOTE — PROGRESS NOTES
Virtual Visit Details    Type of service:  Video Visit   Video Start Time:  10:34 AM  Video End Time: 11:10 AM    Originating Location (pt. Location): Home  Distant Location (provider location):  Off-site  Platform used for Video Visit: [facility] Outpatient Medical Nutrition Therapy      Time Spent:  36 minutes  Session Type:  Initial   Referring Physician:  Connor Tovar DO and Andrew Duffy DO  Reason for RD Visit:   dysphagia, esophageal dysmotility after bariatric surgery, regurgitation of food, heartburn    Nutrition Assessment:  Patient is a 59 year old female with history that includes esophageal dysphagia, esophageal dysmotility, regurgitation of food, heartburn, globus sensation, iron deficiency anemia, wendy en Y, hiatal hernia, JIMENEZ on CPap and trigeminal neuralgia, hx corpectomy.     She stated that she tries to eat but has difficulty eating, swallowing food. Sometimes food will go down but other times, it will sit in her throat/esophagus for a long time and will not move down. On those occasions, she has to regurgitate whole food. At night,she tries to eat supper with her partner, but doesn't always tolerate the meal. Sometimes she can eat and other times cannot eat. She will also try to eat breakfast or lunch. If eats breakfast, then doesn't have lunch and vice versa and then a second meal with her partner at supper. She tries to chew food very well. She tends to eat snacks later into the night. She does not have a routine of eating depending on how busy she is and what she wants to eat. She does not eat a lot of bread but if she does then has 12 grain toasted bread, cannot eat untoasted. She can tolerate cream of wheat for example but not oatmeal. She tolerates tortilla chips with salsa and chews them well. Meats are very difficult for her to swallow. She can eat some fish, eggs scrambled. Sometimes she tolerates about 1/2 burger and other times cannot. She likes sweets and craves  "salt. She also drinks a lot of water. She doesn't drink with meals due to hx RNY but also with her swallowing difficulty, drinking with meals makes it work and brings the food up faster. A couple of years ago when she starting having worsening swallowing issues, then she lost 20 lbs, down to ~215 lbs. Since then she has been staying stable at about 215 lbs. She will make a homemade protein smoothie using bariatric brand protein powders that are 18 and 20 grams of protein and adds fruit and spinach and this is tolerated but only has about 2x/week.     Patient Active Problem List   Diagnosis    Esophageal dysmotility after bariatric surgery    Esophageal dysphagia    Regurgitation of food    Globus sensation    Heartburn     Height:   Ht Readings from Last 1 Encounters:   06/21/24 1.727 m (5' 8\")     Weight: she stated that she hasn't lost any weight. Her UBW has been 215 lbs over the past yaear. She had gotten down to 190 lbs after srugery, then regained and got up to 235 lbs. Initially hen had swallowing issues 2 years ago, she lost 20 lbs and now stable at the ~215 lbs.  Wt Readings from Last 10 Encounters:   06/21/24 97.5 kg (215 lb)        BMI: Estimated body mass index is 32.69 kg/m  as calculated from the following:    Height as of 6/21/24: 1.727 m (5' 8\").    Weight as of 6/21/24: 97.5 kg (215 lb).    Diet Recall:  (some usual/recent meals/snacks/beverages):  Meal Food    Breakfast Tries to eat eggs scrambled or skips   Lunch Skips or if didn't eat B then has lunch: lunchable Ham/turkey, cheddar cheese and cracker   Dinner Cream of wheat or hot dish or burger (sometimes 1/2 burger goes down otherwise doesn't go down)   Snacks Sweets: cookies, ice cream, craves salt: tortilla chips with salsa and chews well), snacks especially in evening.   Beverages 2 c coffee with creamer, 90 oz water, 2x/week: Homemade shake made with protein shake, frozen  fruit/fruit, spinach, made with juice/coconut milk. Occas cup tea " "with honey at night   Alcohol Intake none     Frequency of eating/taking out meals: not often especially with swallowing issues     Labs:    Last Comprehensive Metabolic Panel:  No results found for: \"NA\", \"POTASSIUM\", \"CHLORIDE\", \"CO2\", \"ANIONGAP\", \"GLC\", \"BUN\", \"CR\", \"GFRESTIMATED\", \"AAKASH\"  CBC RESULTS: No results for input(s): \"WBC\", \"RBC\", \"HGB\", \"HCT\", \"MCV\", \"MCH\", \"MCHC\", \"RDW\", \"PLT\" in the last [id number] hours.    Pertinent Medications/vitamin and mineral supplements:    she reported taking a probiotic supplement daily.  No current outpatient medications on file.     No current facility-administered medications for this visit.     Food Allergies:  NKFA   Physical Activity:  walks daily for 30 minutes. Used to walk more 2-3 miles/day but got plantar fascitis and hs 2 bad knees and hx partial knee replacement so less walking in past. Goes to PT to strengthen her knees before having another knee surgery.    MALNUTRITION:  % Weight Loss:  None noted  % Intake:  <75% for >/= 3 months (moderate malnutrition)  Subcutaneous Fat Loss:  None observed  Muscle Loss:  None observed  Fluid Retention:  None noted    Malnutrition Diagnosis: Patient does not meet two of the above criteria necessary for diagnosing malnutrition  In Context of:  Chronic illness or disease    Nutrition Prescription: Nutrition Education     Nutrition Intervention      Provided diet education for dysphagia and esoph dysmotility, heartburn, hx RNY and discussed tips for very soft, pureed and liquid diet ideas.    Answered patient's questions. Patient verbalized understanding of education provided. See Goals below.     Goals:    Aim for eating multiple smaller meals spread out throughout the day such as 4-6 smaller meals per day.    Eat more of a softer, pureed and liquid diet as tolerated (below is a list of some pureed to very soft food ideas).    Can prepare some foods/meals ahead of time, so you can easily reheat to have has a meal especially when " busy/in a hurry.    Drink at least one or more of your homemade protein smoothies per day.    Can make up extra servings of your smoothie and freeze individual servings so you can easily grab and reblend a frozen serving for a meal or snack    For some extra fiber can blend in 2 kiwi fruits into your smoothie and also could blend in some ground flax or ground layne seeds into your smoothie. (Note some good research showing that eating 2 kiwi fruit per day may be helpful with constipation).    Could add a pre-made protein drink such as Premier protein drink into your coffee in the morning instead of creamer to help get some extra protein.    Continue to drink at least 64 oz or more water per day.    Continue to avoid drinking with meals, stopping fluids 30 minutes before meals and waiting at least 30 minutes after meals to drink.    Wait at least 3 hours after eating before lying down and going to sleep to prevent any issues/symptoms overnight.    Here are some food and beverage ideas for a pureed to very soft diet:    Proteins   [] Protein drinks (such as Boost drinks, Ensure Max, Premier Protein drinks or your protein powder).  [] Low-fat yogurt (blended, avoid the ones with fruit chunks)   [] Low-fat cottage cheese (small curd or mashed with fork)  [] Eggs (mashed or hard boiled, soft cooked. Can mashed and add some english to make an egg salad).  [] Stage 1 or 2 baby food meats  [] Mashed beans (such as black beans, joe beans or fat-free refried beans). Cautious with the beans and try to separate out any skin you see.   [] Mashed/blended tuna moistened with some lowfat english or gravy  [] Mashed/ground low fat meat, moistened with gravy or sauce/very soft cooked.  [] Mashed tofu/mashed silken tofu.  [] Note: dairy products/milk will also give you some protein.    Starches  [] Hot cereals (Cream of Wheat, Cream of Rice)   [] Mashed potatoes, mashed sweet potatoes, and mashed winter squash-blended well, smooth  consistency (no skins)  [] Okay to have baked potatoes and baked sweet potatoes just avoid the skins  [] Winter squash cooked (avoid skins)  [] Crackers that dissolve well when you chew them.    Fruit  [] Applesauce   [] bananas   [] Canned fruits such as canned peaches, pears or apricots or canned mandarins  [] 100% fruit juice  [] Stage 1 baby food fruits   [] Mashed/ripe avocado  [] Peeled soft cooked fruits or blended fruits    Vegetables   [] Can blend or have peeled and very soft/mushier cooked/soft vegetables.  [] Stage 1 baby food vegetables  [] 100% vegetable juice (non pulp, smooth/blended)     NOTE: Avoid skins, peels, membranes, stringy foods.    Milk/dairy  [] Skim, 1% milk, plain soy milk or Lactaid milk/lactose free milk   [] Low-fat smooth/blended yogurt/blended Greek yogurt with no chunks of fruit   [] Low-fat Kefir   [] Lowfat pudding   [] Low-fat cream soup, strained/pureed/blended     Other  [] Herbal tea   [] Popsicles without chunks of fruit  [] Sherbet, sorbet, ice cream (all without chunk/smooth consistency)  [] smooth gravies and pureed/blended sauces without pulp/seeds/skins  [] Gelatin  [] Okay to have some butter/margarine or oil for cooking (be careful not to eat large amounts of fats though).  [] Water, tea, herbal tea, coffee, decaf coffee    Nutrition Monitoring and Evaluation: Will monitor adherence to nutrition recommendations at future RD visits.     Further Medical Nutrition Therapy:  Follow-up as needed.    Patient was encouraged to contact RD with any further questions.    Maile Anderson, MS, RD, LD

## 2025-01-19 ENCOUNTER — HEALTH MAINTENANCE LETTER (OUTPATIENT)
Age: 60
End: 2025-01-19